# Patient Record
Sex: MALE | Race: WHITE
[De-identification: names, ages, dates, MRNs, and addresses within clinical notes are randomized per-mention and may not be internally consistent; named-entity substitution may affect disease eponyms.]

---

## 2019-03-06 ENCOUNTER — APPOINTMENT (OUTPATIENT)
Dept: DERMATOLOGY | Facility: CLINIC | Age: 29
End: 2019-03-06
Payer: COMMERCIAL

## 2019-03-06 VITALS — WEIGHT: 160 LBS | HEIGHT: 67 IN | BODY MASS INDEX: 25.11 KG/M2

## 2019-03-06 PROCEDURE — 99203 OFFICE O/P NEW LOW 30 MIN: CPT

## 2019-03-06 RX ORDER — FLUTICASONE PROPIONATE 0.5 MG/G
0.05 CREAM TOPICAL
Qty: 1 | Refills: 1 | Status: ACTIVE | COMMUNITY
Start: 2019-03-06 | End: 1900-01-01

## 2019-03-06 NOTE — PHYSICAL EXAM
[FreeTextEntry3] : Skin examination performed of the face, neck, chest, hands, lower legs;\par The patient is well, alert and oriented, pleasant and cooperative.\par Eyelids, conjunctivae, oral mucosa, digits and nails all normal.  \par No cervical adenopathy.\par \par \par Scaling patches located on face- brow, mid forehead;\par \par minimal male pattern thinning;  scalp;\par

## 2019-03-06 NOTE — HISTORY OF PRESENT ILLNESS
[de-identified] : Pt. c/o hair loss;  noted approx 9 mos ago;  started using Rogaine OTC;\par noted rash on forehead 2-3 months ago;\par Not very itchy; \par works in [place];   \par

## 2019-03-06 NOTE — ASSESSMENT
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

## 2019-09-14 ENCOUNTER — APPOINTMENT (OUTPATIENT)
Dept: DERMATOLOGY | Facility: CLINIC | Age: 29
End: 2019-09-14
Payer: COMMERCIAL

## 2019-09-14 VITALS — BODY MASS INDEX: 25.11 KG/M2 | WEIGHT: 160 LBS | HEIGHT: 67 IN

## 2019-09-14 PROCEDURE — 99214 OFFICE O/P EST MOD 30 MIN: CPT

## 2019-09-14 RX ORDER — KETOCONAZOLE 20.5 MG/ML
2 SHAMPOO, SUSPENSION TOPICAL
Qty: 1 | Refills: 5 | Status: ACTIVE | COMMUNITY
Start: 2019-09-14 | End: 1900-01-01

## 2019-09-14 RX ORDER — MOMETASONE FUROATE 1 MG/ML
0.1 SOLUTION TOPICAL
Qty: 1 | Refills: 1 | Status: ACTIVE | COMMUNITY
Start: 2019-09-14 | End: 1900-01-01

## 2019-09-14 NOTE — ASSESSMENT
[FreeTextEntry1] : Scalp;  discussed at length;  washing only 2-3x/wk\par Rx ketoconazole, alternate with Noble formula shampoo;  mometasone spot tx prn\par continue noble formula, cutivate prn to face\par \par Finasteride;  r/b reiterated;  Rx for 90 day supply  GoodRx coupon given; \par continue annual f/u

## 2019-09-14 NOTE — PHYSICAL EXAM
[FreeTextEntry3] : Scaling patches located on scalp; b/l parietal;  face clear;\par \par + early male pattern AGA

## 2019-09-14 NOTE — HISTORY OF PRESENT ILLNESS
[de-identified] : f/u for check of face, scalp;  using noble formula on face, also shampoo version of scalp\par c/o persistent flaking, scaling on scalp;\par also;  hair; started finasteride from on line service recently;  has tolerated well, discussed at length last visit

## 2020-04-27 ENCOUNTER — APPOINTMENT (OUTPATIENT)
Dept: DERMATOLOGY | Facility: CLINIC | Age: 30
End: 2020-04-27
Payer: COMMERCIAL

## 2020-04-27 ENCOUNTER — TRANSCRIPTION ENCOUNTER (OUTPATIENT)
Age: 30
End: 2020-04-27

## 2020-04-27 PROCEDURE — 99213 OFFICE O/P EST LOW 20 MIN: CPT | Mod: 95

## 2020-04-27 RX ORDER — MOMETASONE FUROATE 1 MG/G
0.1 OINTMENT TOPICAL
Qty: 1 | Refills: 1 | Status: ACTIVE | COMMUNITY
Start: 2020-04-27 | End: 1900-01-01

## 2020-04-27 NOTE — ASSESSMENT
[FreeTextEntry1] : Rash;  feet, toes;\par Therapeutic options and their risks and benefits; along with multiple diagnostic possibilities were discussed at length;\par  \par discussed resemblance of pernio like lesions to COVID skin sequelae;  however, pt. has had significant outdoor exposure, with damp, cold temperatures;\par No other Sxs and no COVID + contacts; \par \par consistent with perniosis or related cold injury; \par mometasone ointment x 1-2 wks, keep feet warm and dry as possible;  f/u if persists; \par

## 2020-04-27 NOTE — PHYSICAL EXAM
[FreeTextEntry3] : Skin examination performed of the face, neck, chest, hands, lower legs;\par The patient is well, alert and oriented, pleasant and cooperative.\par Eyelids, conjunctivae, oral mucosa, digits and nails all normal.  \par No cervical adenopathy.\par \par  + reticulated erythematous patches over sides of toes;  viewed photos sent via Email as well as on telehealth; \par

## 2020-04-27 NOTE — HISTORY OF PRESENT ILLNESS
[Home] : at home, [unfilled] , at the time of the visit. [Medical Office: ([facility])___] : at the medical office located in  [de-identified] : The patient has been fit in for urgent appointment. \par c/o rashes on toes, present approx 3 weeks;  some itching, burning;  used OTC antifungal preps without benefit\par Currently on quarantine for COVID;  no systemic c/o;  no positive contacts; \par

## 2020-07-08 ENCOUNTER — TRANSCRIPTION ENCOUNTER (OUTPATIENT)
Age: 30
End: 2020-07-08

## 2020-09-02 ENCOUNTER — APPOINTMENT (OUTPATIENT)
Dept: DERMATOLOGY | Facility: CLINIC | Age: 30
End: 2020-09-02
Payer: COMMERCIAL

## 2020-09-02 VITALS — WEIGHT: 160 LBS | BODY MASS INDEX: 25.11 KG/M2 | HEIGHT: 67 IN

## 2020-09-02 PROCEDURE — 99213 OFFICE O/P EST LOW 20 MIN: CPT

## 2020-09-02 RX ORDER — FINASTERIDE 1 MG/1
1 TABLET ORAL
Refills: 0 | Status: DISCONTINUED | COMMUNITY
End: 2020-09-02

## 2020-09-02 NOTE — PHYSICAL EXAM
[FreeTextEntry3] : Skin examination performed of the face, neck, chest, hands, lower legs;\par The patient is well, alert and oriented, pleasant and cooperative.\par Eyelids, conjunctivae, oral mucosa, digits and nails all normal.  \par No cervical adenopathy.\par \par \par Scaling patches located on scalp; \par \par mild AGA;  not progressing

## 2020-09-02 NOTE — ASSESSMENT
[FreeTextEntry1] : Seb Derm; still with exacerbations; \par change Rx shampoo to Loprox;  May also try TGel/TSal as OTC; \par \par AGA;  finasteride working well;  renew; \par Continue regular exams;

## 2020-09-02 NOTE — HISTORY OF PRESENT ILLNESS
[de-identified] : Pt. for check of scalp, using topicals; ketoconazole shampoo, alternating with Zinc; \par also:  AGA;  on finasteride since 2019;  tolerating well\par

## 2021-09-21 ENCOUNTER — APPOINTMENT (OUTPATIENT)
Dept: DERMATOLOGY | Facility: CLINIC | Age: 31
End: 2021-09-21
Payer: COMMERCIAL

## 2021-09-21 ENCOUNTER — NON-APPOINTMENT (OUTPATIENT)
Age: 31
End: 2021-09-21

## 2021-09-21 PROCEDURE — 99213 OFFICE O/P EST LOW 20 MIN: CPT

## 2021-09-21 NOTE — ASSESSMENT
[FreeTextEntry1] : Seb Derm; still with exacerbations; \par c/w Loprox;  May also try TGel/TSal as OTC; \par add clobetasol solution PRN; SED\par \par AGA;  finasteride working well;  renew; \par Continue regular exams;

## 2021-09-21 NOTE — HISTORY OF PRESENT ILLNESS
[FreeTextEntry1] : f/u scalp [de-identified] : Pt. for check of scalp, using topicals; ketoconazole shampoo, alternating with Zinc; \par also:  AGA;  on finasteride since 2019;  tolerating well\par

## 2022-09-12 ENCOUNTER — RX RENEWAL (OUTPATIENT)
Age: 32
End: 2022-09-12

## 2022-10-05 ENCOUNTER — APPOINTMENT (OUTPATIENT)
Dept: DERMATOLOGY | Facility: CLINIC | Age: 32
End: 2022-10-05

## 2022-10-05 PROCEDURE — 99214 OFFICE O/P EST MOD 30 MIN: CPT

## 2022-10-05 RX ORDER — FINASTERIDE 1 MG/1
1 TABLET ORAL DAILY
Qty: 1 | Refills: 3 | Status: ACTIVE | COMMUNITY
Start: 2022-10-05 | End: 1900-01-01

## 2023-09-27 ENCOUNTER — APPOINTMENT (OUTPATIENT)
Dept: DERMATOLOGY | Facility: CLINIC | Age: 33
End: 2023-09-27
Payer: COMMERCIAL

## 2023-09-27 DIAGNOSIS — R21 RASH AND OTHER NONSPECIFIC SKIN ERUPTION: ICD-10-CM

## 2023-09-27 DIAGNOSIS — L21.9 SEBORRHEIC DERMATITIS, UNSPECIFIED: ICD-10-CM

## 2023-09-27 DIAGNOSIS — L64.9 ANDROGENIC ALOPECIA, UNSPECIFIED: ICD-10-CM

## 2023-09-27 PROCEDURE — 99213 OFFICE O/P EST LOW 20 MIN: CPT

## 2023-09-27 RX ORDER — FINASTERIDE 1 MG/1
1 TABLET ORAL
Qty: 90 | Refills: 3 | Status: ACTIVE | COMMUNITY
Start: 2019-09-14 | End: 1900-01-01

## 2023-09-27 RX ORDER — CLOBETASOL PROPIONATE 0.5 MG/ML
0.05 SOLUTION TOPICAL
Qty: 1 | Refills: 6 | Status: ACTIVE | COMMUNITY
Start: 2021-09-21 | End: 1900-01-01

## 2023-09-27 RX ORDER — CICLOPIROX 10 MG/.96ML
1 SHAMPOO TOPICAL
Qty: 120 | Refills: 5 | Status: ACTIVE | COMMUNITY
Start: 2020-09-02 | End: 1900-01-01

## 2024-10-17 ENCOUNTER — APPOINTMENT (OUTPATIENT)
Dept: DERMATOLOGY | Facility: CLINIC | Age: 34
End: 2024-10-17
Payer: COMMERCIAL

## 2024-10-17 DIAGNOSIS — L64.9 ANDROGENIC ALOPECIA, UNSPECIFIED: ICD-10-CM

## 2024-10-17 DIAGNOSIS — R21 RASH AND OTHER NONSPECIFIC SKIN ERUPTION: ICD-10-CM

## 2024-10-17 DIAGNOSIS — L21.9 SEBORRHEIC DERMATITIS, UNSPECIFIED: ICD-10-CM

## 2024-10-17 PROCEDURE — 99213 OFFICE O/P EST LOW 20 MIN: CPT
